# Patient Record
Sex: FEMALE | Race: WHITE | Employment: FULL TIME | URBAN - METROPOLITAN AREA
[De-identification: names, ages, dates, MRNs, and addresses within clinical notes are randomized per-mention and may not be internally consistent; named-entity substitution may affect disease eponyms.]

---

## 2017-08-31 ENCOUNTER — PATIENT OUTREACH (OUTPATIENT)
Dept: FAMILY MEDICINE CLINIC | Facility: CLINIC | Age: 52
End: 2017-08-31

## 2018-01-26 ENCOUNTER — PATIENT OUTREACH (OUTPATIENT)
Dept: FAMILY MEDICINE CLINIC | Facility: CLINIC | Age: 53
End: 2018-01-26

## 2018-04-04 ENCOUNTER — OFFICE VISIT (OUTPATIENT)
Dept: FAMILY MEDICINE CLINIC | Facility: CLINIC | Age: 53
End: 2018-04-04

## 2018-04-04 ENCOUNTER — APPOINTMENT (OUTPATIENT)
Dept: LAB | Age: 53
End: 2018-04-04
Attending: NURSE PRACTITIONER

## 2018-04-04 VITALS
OXYGEN SATURATION: 97 % | DIASTOLIC BLOOD PRESSURE: 100 MMHG | WEIGHT: 145.19 LBS | RESPIRATION RATE: 16 BRPM | HEIGHT: 62.25 IN | TEMPERATURE: 99 F | BODY MASS INDEX: 26.38 KG/M2 | SYSTOLIC BLOOD PRESSURE: 186 MMHG | HEART RATE: 90 BPM

## 2018-04-04 DIAGNOSIS — I10 ESSENTIAL HYPERTENSION: Primary | ICD-10-CM

## 2018-04-04 PROCEDURE — 99203 OFFICE O/P NEW LOW 30 MIN: CPT | Performed by: NURSE PRACTITIONER

## 2018-04-04 PROCEDURE — 80053 COMPREHEN METABOLIC PANEL: CPT | Performed by: NURSE PRACTITIONER

## 2018-04-04 PROCEDURE — 36415 COLL VENOUS BLD VENIPUNCTURE: CPT | Performed by: NURSE PRACTITIONER

## 2018-04-04 RX ORDER — LISINOPRIL 10 MG/1
10 TABLET ORAL
Qty: 30 TABLET | Refills: 0 | Status: SHIPPED | OUTPATIENT
Start: 2018-04-04 | End: 2018-04-06

## 2018-04-04 RX ORDER — HYDROCHLOROTHIAZIDE 25 MG/1
25 TABLET ORAL
Qty: 30 TABLET | Refills: 0 | Status: SHIPPED | OUTPATIENT
Start: 2018-04-04 | End: 2018-04-06

## 2018-04-04 NOTE — PATIENT INSTRUCTIONS
CMP pending  Start lisinopril and HCTZ daily  Recheck on Friday or Saturday - sooner if pressure not improving.

## 2018-04-04 NOTE — PROGRESS NOTES
HPI:    Patient ID: Antwan Bassett is a 47year old female. HPI     Present as a new patient to our clinic. States that she lost her health care insurance at her employer. Works in [employer]. Has not been taking medication for over a year.    Kennedy toro Neck: Normal range of motion. Neck supple. No thyromegaly present. Cardiovascular: Normal rate, regular rhythm, normal heart sounds and intact distal pulses. Pulmonary/Chest: Effort normal and breath sounds normal.   Abdominal: Soft.  Normal appearan

## 2018-04-05 ENCOUNTER — TELEPHONE (OUTPATIENT)
Dept: FAMILY MEDICINE CLINIC | Facility: CLINIC | Age: 53
End: 2018-04-05

## 2018-04-05 NOTE — TELEPHONE ENCOUNTER
----- Message from DONALD Lynn sent at 4/4/2018  8:19 PM CDT -----  Labs normal. Please let patient know. Thank you.  Caro Haskins, 04/04/18, 8:19 PM

## 2018-04-06 ENCOUNTER — OFFICE VISIT (OUTPATIENT)
Dept: FAMILY MEDICINE CLINIC | Facility: CLINIC | Age: 53
End: 2018-04-06

## 2018-04-06 VITALS
WEIGHT: 144 LBS | HEIGHT: 62.25 IN | TEMPERATURE: 99 F | DIASTOLIC BLOOD PRESSURE: 78 MMHG | HEART RATE: 92 BPM | BODY MASS INDEX: 26.17 KG/M2 | RESPIRATION RATE: 14 BRPM | SYSTOLIC BLOOD PRESSURE: 120 MMHG

## 2018-04-06 DIAGNOSIS — I10 ESSENTIAL HYPERTENSION: Primary | ICD-10-CM

## 2018-04-06 PROCEDURE — 99212 OFFICE O/P EST SF 10 MIN: CPT | Performed by: NURSE PRACTITIONER

## 2018-04-06 RX ORDER — LISINOPRIL 10 MG/1
10 TABLET ORAL
Qty: 90 TABLET | Refills: 0 | Status: SHIPPED | OUTPATIENT
Start: 2018-04-06 | End: 2018-08-13

## 2018-04-06 RX ORDER — HYDROCHLOROTHIAZIDE 25 MG/1
25 TABLET ORAL
Qty: 90 TABLET | Refills: 0 | Status: SHIPPED | OUTPATIENT
Start: 2018-04-06 | End: 2018-08-13

## 2018-04-06 NOTE — PATIENT INSTRUCTIONS
Continue current medications. Refills done. Recheck in 3 months–sooner if gets insurance before then. Otherwise follow-up as needed.

## 2018-04-06 NOTE — PROGRESS NOTES
HPI:    Patient ID: Yazmin Chapman is a 47year old female. HPI   Patient is present for recheck on her hypertension. She did start taking her lisinopril and hydrochlorothiazide.   States that she does notice a significant change as far is even how he Body mass index is 26.13 kg/m². ASSESSMENT/PLAN:   Essential hypertension  (primary encounter diagnosis)    No orders of the defined types were placed in this encounter.       Meds This Visit:  Signed Prescriptions Disp Refills    hydrochlor

## 2018-05-19 RX ORDER — TRAZODONE HYDROCHLORIDE 50 MG/1
50 TABLET ORAL
Qty: 90 TABLET | Refills: 0 | OUTPATIENT
Start: 2018-05-19

## 2018-05-19 NOTE — TELEPHONE ENCOUNTER
Medication never prescribed by our office. Last refill was a year ago - per [place] . Needs appointment to discuss.    MARIELY Reina, ROB-BC  5/19/2018  11:02 AM

## 2018-05-19 NOTE — TELEPHONE ENCOUNTER
It doesn't look like this medication is prescribed by us. Last RF given by Dr. Deric Boyle. Please advise.      Future appt:    Last Appointment:  4/6/2018; Recheck in 3 months    Cholesterol, Total (mg/dL)   Date Value   03/26/2015 250 (H)   ----------  HDL Ch

## 2018-05-19 NOTE — TELEPHONE ENCOUNTER
Patient informed of the below recommendations. States once she get her insurance \"figured out\" she will c/b to make an appt.

## 2018-06-26 ENCOUNTER — OFFICE VISIT (OUTPATIENT)
Dept: FAMILY MEDICINE CLINIC | Facility: CLINIC | Age: 53
End: 2018-06-26

## 2018-06-26 VITALS
RESPIRATION RATE: 16 BRPM | SYSTOLIC BLOOD PRESSURE: 99 MMHG | TEMPERATURE: 98 F | OXYGEN SATURATION: 99 % | HEIGHT: 62.25 IN | BODY MASS INDEX: 24.89 KG/M2 | DIASTOLIC BLOOD PRESSURE: 48 MMHG | HEART RATE: 76 BPM | WEIGHT: 137 LBS

## 2018-06-26 DIAGNOSIS — J40 SINOBRONCHITIS: Primary | ICD-10-CM

## 2018-06-26 DIAGNOSIS — J32.9 SINOBRONCHITIS: Primary | ICD-10-CM

## 2018-06-26 PROCEDURE — 99214 OFFICE O/P EST MOD 30 MIN: CPT | Performed by: NURSE PRACTITIONER

## 2018-06-26 RX ORDER — PROMETHAZINE HYDROCHLORIDE AND CODEINE PHOSPHATE 6.25; 1 MG/5ML; MG/5ML
5 SYRUP ORAL EVERY 6 HOURS PRN
Qty: 118 ML | Refills: 0 | Status: SHIPPED
Start: 2018-06-26 | End: 2018-07-09 | Stop reason: ALTCHOICE

## 2018-06-26 RX ORDER — AZITHROMYCIN 500 MG/1
500 TABLET, FILM COATED ORAL DAILY
Qty: 7 TABLET | Refills: 0 | Status: SHIPPED | OUTPATIENT
Start: 2018-06-26 | End: 2018-06-29 | Stop reason: ALTCHOICE

## 2018-06-26 NOTE — PROGRESS NOTES
HPI:    Patient ID: Justice Gotti is a 47year old female. HPI    Started with a cold that got worse quickly. Is coughing up yellow sputum. States it hurts to cough. Fever 101 yesterday when got home from work.    No known exposure  Sudafed and Advi ear and ear canal normal.   Nose: Mucosal edema present. Mouth/Throat: Posterior oropharyngeal erythema present. Eyes: Conjunctivae are normal.   Neck: Normal range of motion. Neck supple.    Cardiovascular: Normal rate, regular rhythm and normal heart

## 2018-06-27 ENCOUNTER — TELEPHONE (OUTPATIENT)
Dept: FAMILY MEDICINE CLINIC | Facility: CLINIC | Age: 53
End: 2018-06-27

## 2018-06-27 NOTE — TELEPHONE ENCOUNTER
Antibiotics needs 48 to 72 hours to start working. If significantly worse, can go to  ER or a walk-in clinic as we do not have any appointments here today. If needs a note for more time from work, I will gladly write this for patient.    Please let daisha

## 2018-06-27 NOTE — TELEPHONE ENCOUNTER
patient was seen yesterday and was given an abx- patient states that she is still running a fever and does not feel any better - wants to speak to nurse

## 2018-06-27 NOTE — TELEPHONE ENCOUNTER
Let pt know the following below. Pt verbalized her understanding and had no other questions at this time. Patient asks that we write a note for tomorrow.

## 2018-06-27 NOTE — TELEPHONE ENCOUNTER
Patient is calling stating that she isnt feeling any better. Patient has taken 2 doses of the zpack. Patient states that she really expected to feel better after first two doses. Patient states that she has a fever still.  Asked patient if she had taken

## 2018-06-29 ENCOUNTER — OFFICE VISIT (OUTPATIENT)
Dept: FAMILY MEDICINE CLINIC | Facility: CLINIC | Age: 53
End: 2018-06-29

## 2018-06-29 ENCOUNTER — HOSPITAL ENCOUNTER (OUTPATIENT)
Dept: GENERAL RADIOLOGY | Age: 53
Discharge: HOME | End: 2018-06-29
Attending: NURSE PRACTITIONER
Payer: COMMERCIAL

## 2018-06-29 ENCOUNTER — TELEPHONE (OUTPATIENT)
Dept: FAMILY MEDICINE CLINIC | Facility: CLINIC | Age: 53
End: 2018-06-29

## 2018-06-29 VITALS
TEMPERATURE: 100 F | OXYGEN SATURATION: 98 % | BODY MASS INDEX: 24.71 KG/M2 | SYSTOLIC BLOOD PRESSURE: 90 MMHG | HEIGHT: 62.25 IN | RESPIRATION RATE: 20 BRPM | WEIGHT: 136 LBS | DIASTOLIC BLOOD PRESSURE: 62 MMHG | HEART RATE: 102 BPM

## 2018-06-29 DIAGNOSIS — Z72.0 TOBACCO USE: ICD-10-CM

## 2018-06-29 DIAGNOSIS — R05.9 COUGH: Primary | ICD-10-CM

## 2018-06-29 DIAGNOSIS — R05.9 COUGH: ICD-10-CM

## 2018-06-29 PROCEDURE — 99214 OFFICE O/P EST MOD 30 MIN: CPT | Performed by: NURSE PRACTITIONER

## 2018-06-29 PROCEDURE — 71046 X-RAY EXAM CHEST 2 VIEWS: CPT | Performed by: NURSE PRACTITIONER

## 2018-06-29 RX ORDER — FLUTICASONE PROPIONATE AND SALMETEROL 250; 50 UG/1; UG/1
1 POWDER RESPIRATORY (INHALATION) EVERY 12 HOURS SCHEDULED
Qty: 2 PACKAGE | Refills: 0 | COMMUNITY
Start: 2018-06-29 | End: 2018-08-13 | Stop reason: ALTCHOICE

## 2018-06-29 RX ORDER — LEVOFLOXACIN 500 MG/1
500 TABLET, FILM COATED ORAL DAILY
Qty: 10 TABLET | Refills: 0 | Status: SHIPPED | OUTPATIENT
Start: 2018-06-29 | End: 2018-07-09 | Stop reason: ALTCHOICE

## 2018-06-29 NOTE — TELEPHONE ENCOUNTER
Patient was seen on 6/26/18 with Caro  Patient states she is taking the abx and promethazine  Patients states she is not feeling any better  States she is still coughing and wheezing  Patient having bouts of coughing on the phone and voice is hoarse  Stat

## 2018-06-29 NOTE — TELEPHONE ENCOUNTER
was seen Tuesday by Caro  and now c/o feeling far worse   w/ wheezing   and is crying . ..     please advise  / call back ASAP

## 2018-06-29 NOTE — PATIENT INSTRUCTIONS
Stop zithromax. Start Levaquin. Use advair as directed - rinse mouth after using. Fluids and rest.   If worsens, to ER. Note for work done. Recheck in 2 weeks - sooner if needed.

## 2018-06-29 NOTE — TELEPHONE ENCOUNTER
Recommend that patient schedule appointment. I can try to work her in at 2:15 today. She may need CXR and/or breathing treatment. Please let patient know. Thank you.  Caro Haskins, 06/29/18, 9:29 AM

## 2018-06-29 NOTE — PROGRESS NOTES
HPI:    Patient ID: Lina Prado is a 47year old female. HPI    Patient present with complaints of continued cough. States that she is not feeling any better. Is now having diarrhea from the antibiotic. Taking Robitussin at times.  Taking Prometha Posterior oropharyngeal erythema present. Eyes: Conjunctivae are normal.   Neck: Normal range of motion. Neck supple. Cardiovascular: Normal rate, regular rhythm and normal heart sounds. Exam reveals no friction rub. No murmur heard.   Pulmonary/Michelle

## 2018-07-02 ENCOUNTER — TELEPHONE (OUTPATIENT)
Dept: FAMILY MEDICINE CLINIC | Facility: CLINIC | Age: 53
End: 2018-07-02

## 2018-07-09 ENCOUNTER — OFFICE VISIT (OUTPATIENT)
Dept: FAMILY MEDICINE CLINIC | Facility: CLINIC | Age: 53
End: 2018-07-09

## 2018-07-09 VITALS
BODY MASS INDEX: 24.71 KG/M2 | HEART RATE: 86 BPM | OXYGEN SATURATION: 97 % | HEIGHT: 62.25 IN | DIASTOLIC BLOOD PRESSURE: 60 MMHG | WEIGHT: 136 LBS | TEMPERATURE: 98 F | RESPIRATION RATE: 16 BRPM | SYSTOLIC BLOOD PRESSURE: 100 MMHG

## 2018-07-09 DIAGNOSIS — J18.9 PNEUMONIA DUE TO INFECTIOUS ORGANISM, UNSPECIFIED LATERALITY, UNSPECIFIED PART OF LUNG: Primary | ICD-10-CM

## 2018-07-09 PROCEDURE — 99213 OFFICE O/P EST LOW 20 MIN: CPT | Performed by: NURSE PRACTITIONER

## 2018-07-09 RX ORDER — TRAZODONE HYDROCHLORIDE 50 MG/1
TABLET ORAL
Qty: 90 TABLET | Refills: 0 | Status: SHIPPED | OUTPATIENT
Start: 2018-07-09 | End: 2018-08-13

## 2018-07-09 NOTE — PROGRESS NOTES
HPI:    Patient ID: Trent Austin is a 47year old female. HPI     Present for recheck on lungs. Would like a refill of trazodone to help her sleep. Has been on this for years. Will be changing to Dr. Char Brothers for primary care.    Patient is tr dry.   Psychiatric: She has a normal mood and affect. Her behavior is normal. Judgment and thought content normal.   Nursing note and vitals reviewed.      07/09/18  1441   BP: 100/60   Pulse: 86   Resp: 16   Temp: 97.9 °F (36.6 °C)   TempSrc: Temporal   Sp

## 2018-08-13 ENCOUNTER — OFFICE VISIT (OUTPATIENT)
Dept: FAMILY MEDICINE CLINIC | Facility: CLINIC | Age: 53
End: 2018-08-13
Payer: COMMERCIAL

## 2018-08-13 VITALS
TEMPERATURE: 99 F | RESPIRATION RATE: 16 BRPM | BODY MASS INDEX: 23.92 KG/M2 | WEIGHT: 130 LBS | SYSTOLIC BLOOD PRESSURE: 114 MMHG | DIASTOLIC BLOOD PRESSURE: 62 MMHG | HEART RATE: 76 BPM | HEIGHT: 61.75 IN

## 2018-08-13 DIAGNOSIS — G47.9 SLEEP DISORDER: ICD-10-CM

## 2018-08-13 DIAGNOSIS — Z00.00 HEALTH CARE MAINTENANCE: Primary | ICD-10-CM

## 2018-08-13 DIAGNOSIS — I10 ESSENTIAL HYPERTENSION: ICD-10-CM

## 2018-08-13 DIAGNOSIS — Z72.0 TOBACCO USE: ICD-10-CM

## 2018-08-13 DIAGNOSIS — R53.83 FATIGUE, UNSPECIFIED TYPE: ICD-10-CM

## 2018-08-13 PROCEDURE — 99396 PREV VISIT EST AGE 40-64: CPT | Performed by: FAMILY MEDICINE

## 2018-08-13 RX ORDER — TRAZODONE HYDROCHLORIDE 50 MG/1
TABLET ORAL
Qty: 90 TABLET | Refills: 3 | Status: SHIPPED | OUTPATIENT
Start: 2018-08-13 | End: 2019-08-21

## 2018-08-13 RX ORDER — LISINOPRIL 10 MG/1
10 TABLET ORAL
Qty: 90 TABLET | Refills: 3 | Status: SHIPPED | OUTPATIENT
Start: 2018-08-13 | End: 2019-06-13

## 2018-08-13 RX ORDER — HYDROCHLOROTHIAZIDE 25 MG/1
25 TABLET ORAL
Qty: 90 TABLET | Refills: 3 | Status: SHIPPED | OUTPATIENT
Start: 2018-08-13 | End: 2019-06-13

## 2018-08-13 NOTE — PATIENT INSTRUCTIONS
Good exam today     Consider mammogram in near future ( can get at office in near future )     Obtain labs in near future.      Recheck in 1 year

## 2018-08-13 NOTE — PROGRESS NOTES
Chief Complaint:   Patient presents with:  Physical: does not want a pap      HPI:   This is a 47year old female coming in for healthcare check. Patient overall is been feeling well she does have some issues of chronic fatigue.   She feels that it is rela Denies eye pain, visual changes,     Ears, Nose, Throat:  Denies hearing loss,or disturbance. Denies sore throat  INTEGUMENTARY:  Denies rashes, itching.     CARDIOVASCULAR: Denies  chest discomfort, palpitations, edema,  RESPIRATORY:  Denies shortness of b tenderness,FROM. EXTREMITIES:  No edema, no cyanosis,FROM, 2+ dorsalis pedis pulses bilaterally. NEURO:  No deficit, normal gait, strength and tone, sensory intact,   PSYCH: no depression or anxiety noted. ASSESSMENT AND PLAN:   1.  [facility] Benny Angelucci, MD  8/13/2018  4:35 PM

## 2018-08-27 LAB
ABSOLUTE BASOPHILS: 47 CELLS/UL (ref 0–200)
ABSOLUTE EOSINOPHILS: 269 CELLS/UL (ref 15–500)
ABSOLUTE LYMPHOCYTES: 2236 CELLS/UL (ref 850–3900)
ABSOLUTE MONOCYTES: 419 CELLS/UL (ref 200–950)
ABSOLUTE NEUTROPHILS: 4930 CELLS/UL (ref 1500–7800)
ALBUMIN/GLOBULIN RATIO: 1.9 (CALC) (ref 1–2.5)
ALBUMIN: 4.2 G/DL (ref 3.6–5.1)
ALKALINE PHOSPHATASE: 47 U/L (ref 33–130)
ALT: 13 U/L (ref 6–29)
AST: 16 U/L (ref 10–35)
BASOPHILS: 0.6 %
BILIRUBIN, TOTAL: 0.3 MG/DL (ref 0.2–1.2)
BUN: 22 MG/DL (ref 7–25)
CALCIUM: 9.7 MG/DL (ref 8.6–10.4)
CARBON DIOXIDE: 26 MMOL/L (ref 20–32)
CHLORIDE: 105 MMOL/L (ref 98–110)
CHOL/HDLC RATIO: 5.2 (CALC)
CHOLESTEROL, TOTAL: 203 MG/DL
CREATININE: 0.89 MG/DL (ref 0.5–1.05)
EGFR IF AFRICN AM: 87 ML/MIN/1.73M2
EGFR IF NONAFRICN AM: 75 ML/MIN/1.73M2
EOSINOPHILS: 3.4 %
GLOBULIN: 2.2 G/DL (CALC) (ref 1.9–3.7)
GLUCOSE: 113 MG/DL (ref 65–99)
HDL CHOLESTEROL: 39 MG/DL
HEMATOCRIT: 36.1 % (ref 35–45)
HEMOGLOBIN: 11.9 G/DL (ref 11.7–15.5)
LDL-CHOLESTEROL: 131 MG/DL (CALC)
LYMPHOCYTES: 28.3 %
MCH: 31.8 PG (ref 27–33)
MCHC: 33 G/DL (ref 32–36)
MCV: 96.5 FL (ref 80–100)
MONOCYTES: 5.3 %
MPV: 9.9 FL (ref 7.5–12.5)
NEUTROPHILS: 62.4 %
NON-HDL CHOLESTEROL: 164 MG/DL (CALC)
PLATELET COUNT: 325 THOUSAND/UL (ref 140–400)
POTASSIUM: 3.9 MMOL/L (ref 3.5–5.3)
PROTEIN, TOTAL: 6.4 G/DL (ref 6.1–8.1)
RDW: 12.6 % (ref 11–15)
RED BLOOD CELL COUNT: 3.74 MILLION/UL (ref 3.8–5.1)
SODIUM: 139 MMOL/L (ref 135–146)
TRIGLYCERIDES: 190 MG/DL
TSH W/REFLEX TO FT4: 0.65 MIU/L
VITAMIN D, 25-OH, TOTAL: 32 NG/ML (ref 30–100)
WHITE BLOOD CELL COUNT: 7.9 THOUSAND/UL (ref 3.8–10.8)

## 2018-08-28 ENCOUNTER — TELEPHONE (OUTPATIENT)
Dept: FAMILY MEDICINE CLINIC | Facility: CLINIC | Age: 53
End: 2018-08-28

## 2018-08-28 NOTE — TELEPHONE ENCOUNTER
----- Message from Catalina Andersen MD sent at 8/28/2018  9:14 AM CDT -----  Labs reviewed. Cholesterol profile overall improved. Total cholesterol came down from 250-203 and LDL cholesterol came down from 179-131. This is a good change.   Her HDL unfo

## 2019-01-22 ENCOUNTER — OFFICE VISIT (OUTPATIENT)
Dept: FAMILY MEDICINE CLINIC | Facility: CLINIC | Age: 54
End: 2019-01-22
Payer: COMMERCIAL

## 2019-01-22 VITALS
OXYGEN SATURATION: 93 % | BODY MASS INDEX: 23 KG/M2 | HEART RATE: 81 BPM | WEIGHT: 124 LBS | RESPIRATION RATE: 16 BRPM | TEMPERATURE: 98 F | DIASTOLIC BLOOD PRESSURE: 78 MMHG | SYSTOLIC BLOOD PRESSURE: 122 MMHG

## 2019-01-22 DIAGNOSIS — J32.9 SINOBRONCHITIS: Primary | ICD-10-CM

## 2019-01-22 DIAGNOSIS — J40 SINOBRONCHITIS: Primary | ICD-10-CM

## 2019-01-22 PROCEDURE — 99214 OFFICE O/P EST MOD 30 MIN: CPT | Performed by: NURSE PRACTITIONER

## 2019-01-22 RX ORDER — ALBUTEROL SULFATE 90 UG/1
2 AEROSOL, METERED RESPIRATORY (INHALATION) EVERY 4 HOURS PRN
Qty: 1 INHALER | Refills: 6 | Status: SHIPPED | OUTPATIENT
Start: 2019-01-22

## 2019-01-22 RX ORDER — LEVOFLOXACIN 500 MG/1
500 TABLET, FILM COATED ORAL DAILY
Qty: 10 TABLET | Refills: 0 | Status: SHIPPED | OUTPATIENT
Start: 2019-01-22 | End: 2019-02-01

## 2019-01-22 NOTE — PATIENT INSTRUCTIONS
Directed to take antibiotics until gone. Recommend to eat yogurt or take probiotic daily while on antibiotic, but not the same time as the antibiotic. Treat symptoms as needed. Use albuterol as needed for the cough.    Encouraged to decrease her smoking

## 2019-01-22 NOTE — PROGRESS NOTES
HPI:    Patient ID: Francisco Jean is a 47year old female. HPI     Present with complaints of a bad taste in her mouth. Now getting nasal congestion and moved to her head. Is starting to cough. Phlegm is bright yellow.    Been taking Mucinex at night membrane, external ear and ear canal normal.   Left Ear: Hearing, tympanic membrane, external ear and ear canal normal.   Nose: Mucosal edema present. Right sinus exhibits maxillary sinus tenderness and frontal sinus tenderness.  Left sinus exhibits maxilla as needed.            OY#[id number]

## 2019-03-20 ENCOUNTER — TELEPHONE (OUTPATIENT)
Dept: FAMILY MEDICINE CLINIC | Facility: CLINIC | Age: 54
End: 2019-03-20

## 2019-03-20 NOTE — TELEPHONE ENCOUNTER
We received a request for medical records from [facility]. They are requesting a copy of pt's entire medical history from the last 5 years, this was sent to [employer].

## 2019-04-09 ENCOUNTER — TELEPHONE (OUTPATIENT)
Dept: FAMILY MEDICINE CLINIC | Facility: CLINIC | Age: 54
End: 2019-04-09

## 2019-04-10 NOTE — TELEPHONE ENCOUNTER
Medical Records request was recieved from [id number] [place] [address] requesting 1/1/2014-present. Request sent to [employer].

## 2019-06-13 RX ORDER — LISINOPRIL 10 MG/1
TABLET ORAL
Qty: 30 TABLET | Refills: 1 | Status: SHIPPED | OUTPATIENT
Start: 2019-06-13 | End: 2019-07-06

## 2019-06-13 RX ORDER — HYDROCHLOROTHIAZIDE 25 MG/1
TABLET ORAL
Qty: 30 TABLET | Refills: 1 | Status: SHIPPED | OUTPATIENT
Start: 2019-06-13 | End: 2019-07-06

## 2019-06-13 NOTE — TELEPHONE ENCOUNTER
Future appt:  No future appointments.   Last Appointment: 8/13/18   CHOLESTEROL, TOTAL (mg/dL)   Date Value   08/25/2018 203 (H)     HDL CHOLESTEROL (mg/dL)   Date Value   08/25/2018 39 (L)     LDL-CHOLESTEROL (mg/dL (calc))   Date Value   08/25/2018 131 (H

## 2019-07-06 NOTE — TELEPHONE ENCOUNTER
Future appt:    Last Appointment: 8/13/18 physical; recheck 1 year  CHOLESTEROL, TOTAL (mg/dL)   Date Value   08/25/2018 203 (H)     HDL CHOLESTEROL (mg/dL)   Date Value   08/25/2018 39 (L)     LDL-CHOLESTEROL (mg/dL (calc))   Date Value   08/25/2018 131 (

## 2019-07-08 RX ORDER — HYDROCHLOROTHIAZIDE 25 MG/1
TABLET ORAL
Qty: 30 TABLET | Refills: 1 | Status: SHIPPED | OUTPATIENT
Start: 2019-07-08 | End: 2019-08-01

## 2019-07-08 RX ORDER — LISINOPRIL 10 MG/1
TABLET ORAL
Qty: 30 TABLET | Refills: 1 | Status: SHIPPED | OUTPATIENT
Start: 2019-07-08 | End: 2019-08-01

## 2019-07-08 NOTE — TELEPHONE ENCOUNTER
Patient returned call to the . Called patient and left detailed message informing her of the below and asking for her to call back to schedule.

## 2019-07-08 NOTE — TELEPHONE ENCOUNTER
Your appointments     Date & Time Appointment Department [facility])    Aug 21, 2019  3:30 PM CDT Physical - Established Patient with Tiana Maria MD [address], [facility] ([address] [facility])            [facility]

## 2019-07-08 NOTE — TELEPHONE ENCOUNTER
Chart reviewed     Alan Esparza for refill to get patient to appointment     I do not see that patient has had mammogram     Patient is due for physical in August.

## 2019-08-01 ENCOUNTER — TELEPHONE (OUTPATIENT)
Dept: FAMILY MEDICINE CLINIC | Facility: CLINIC | Age: 54
End: 2019-08-01

## 2019-08-01 DIAGNOSIS — I10 ESSENTIAL HYPERTENSION: Primary | ICD-10-CM

## 2019-08-01 RX ORDER — HYDROCHLOROTHIAZIDE 25 MG/1
TABLET ORAL
Qty: 30 TABLET | Refills: 1 | OUTPATIENT
Start: 2019-08-01

## 2019-08-01 NOTE — TELEPHONE ENCOUNTER
question on her lisinopril and hctz - insurance is looking for 90day supply, did  first 30 with 30 remaining

## 2019-08-01 NOTE — TELEPHONE ENCOUNTER
Future appt:     Your appointments     Date & Time Appointment Department [facility])    Aug 21, 2019  3:30 PM CDT Physical - Established Patient with Eric Negrete MD [address] [facility], [place]one Soulier ([facility])

## 2019-08-02 RX ORDER — HYDROCHLOROTHIAZIDE 25 MG/1
25 TABLET ORAL
Qty: 90 TABLET | Refills: 0 | Status: SHIPPED | OUTPATIENT
Start: 2019-08-02 | End: 2019-08-21

## 2019-08-02 RX ORDER — LISINOPRIL 10 MG/1
10 TABLET ORAL
Qty: 90 TABLET | Refills: 0 | Status: SHIPPED | OUTPATIENT
Start: 2019-08-02 | End: 2019-08-21

## 2019-08-21 ENCOUNTER — OFFICE VISIT (OUTPATIENT)
Dept: FAMILY MEDICINE CLINIC | Facility: CLINIC | Age: 54
End: 2019-08-21
Payer: COMMERCIAL

## 2019-08-21 VITALS
SYSTOLIC BLOOD PRESSURE: 120 MMHG | RESPIRATION RATE: 14 BRPM | OXYGEN SATURATION: 98 % | DIASTOLIC BLOOD PRESSURE: 66 MMHG | HEART RATE: 74 BPM | TEMPERATURE: 98 F | HEIGHT: 61.75 IN | BODY MASS INDEX: 21.83 KG/M2 | WEIGHT: 118.63 LBS

## 2019-08-21 DIAGNOSIS — Z00.00 HEALTH CARE MAINTENANCE: Primary | ICD-10-CM

## 2019-08-21 DIAGNOSIS — E78.00 PURE HYPERCHOLESTEROLEMIA: ICD-10-CM

## 2019-08-21 DIAGNOSIS — I10 ESSENTIAL HYPERTENSION: ICD-10-CM

## 2019-08-21 DIAGNOSIS — E55.9 VITAMIN D DEFICIENCY: ICD-10-CM

## 2019-08-21 PROCEDURE — 99396 PREV VISIT EST AGE 40-64: CPT | Performed by: FAMILY MEDICINE

## 2019-08-21 RX ORDER — HYDROCHLOROTHIAZIDE 25 MG/1
25 TABLET ORAL
Qty: 90 TABLET | Refills: 3 | Status: SHIPPED | OUTPATIENT
Start: 2019-08-21 | End: 2020-10-12

## 2019-08-21 RX ORDER — TRAZODONE HYDROCHLORIDE 50 MG/1
TABLET ORAL
Qty: 90 TABLET | Refills: 3 | Status: SHIPPED | OUTPATIENT
Start: 2019-08-21 | End: 2020-07-13

## 2019-08-21 RX ORDER — LISINOPRIL 10 MG/1
10 TABLET ORAL
Qty: 90 TABLET | Refills: 3 | Status: SHIPPED | OUTPATIENT
Start: 2019-08-21 | End: 2020-10-12

## 2019-08-21 NOTE — PATIENT INSTRUCTIONS
Good exam       Obtain labs in near fuutre. Call insurance - recommend mammogram   We have mammogram here now. Continue with current medication - refills sent in     Recheck in 1 year.

## 2019-08-21 NOTE — PROGRESS NOTES
Chief Complaint:   Patient presents with:  Physical      HPI:   This is a 47year old female coming in for healthcare check. Discussed heart disease prevention, cancer early detection and immunizations. Patient continues to smoke.   She was able to cu PHOSPHATASE 47 33 - 130 U/L    AST 16 10 - 35 U/L    ALT 13 6 - 29 U/L   CBC WITH DIFFERENTIAL WITH PLATELET   Result Value Ref Range    WHITE BLOOD CELL COUNT 7.9 3.8 - 10.8 Thousand/uL    RED BLOOD CELL COUNT 3.74 (L) 3.80 - 5.10 Million/uL    HEMOGLOBIN status: Current Every Day Smoker        Packs/day: 0.50        Years: 33.00        Pack years: 16.5      Smokeless tobacco: Former User    Substance and Sexual Activity      Alcohol use: No      Drug use: No      Sexual activity: Yes        Partners: Male anxiety. ENDOCRINOLOGIC:  Denies cold or heat intolerance,   ALLERGIES:  Denies allergic response,    EXAM:   /66 (BP Location: Left arm, Patient Position: Sitting, Cuff Size: adult)   Pulse 74   Temp 97.8 °F (36.6 °C) (Temporal)   Resp 14   Ht 61. 7 PANEL    3. Vitamin D deficiency    - VITAMIN D, 25-HYDROXY    4. Pure hypercholesterolemia          Meds & Refills for this Visit:  Requested Prescriptions     Signed Prescriptions Disp Refills   • lisinopril 10 MG Oral Tab 90 tablet 3     Sig: Take 1 tab

## 2020-07-13 RX ORDER — TRAZODONE HYDROCHLORIDE 50 MG/1
TABLET ORAL
Qty: 90 TABLET | Refills: 0 | Status: SHIPPED | OUTPATIENT
Start: 2020-07-13 | End: 2020-10-09

## 2020-07-13 NOTE — TELEPHONE ENCOUNTER
Please advise refill of Trazodone 50mg. Last Rx:8/21/19    Future appt:    Last Appointment with provider: 8/21/19 for Physical - per notes recheck in 1 year. Last appointment at [facility]: 8/21/19    CHOLESTEROL, TOTAL (mg/dL)   Date Value   08/25/2018 203 (H)     HDL CHOLESTEROL (mg/dL)   Date Value   08/25/2018 39 (L)     LDL-CHOLESTEROL (mg/dL (calc))   Date Value   08/25/2018 131 (H)     TRIGLYCERIDES (mg/dL)   Date Value   08/25/2018 190 (H)     No results found for: EAG, A1C  Lab Results   Component Value Date    TSH 0.510 03/26/2015    TSHT4 0.65 08/25/2018       No follow-ups on file.

## 2020-10-09 RX ORDER — TRAZODONE HYDROCHLORIDE 50 MG/1
TABLET ORAL
Qty: 90 TABLET | Refills: 0 | Status: SHIPPED | OUTPATIENT
Start: 2020-10-09 | End: 2021-01-09

## 2020-10-09 NOTE — TELEPHONE ENCOUNTER
Future appt: Your appointments     Date & Time Appointment Department [facility])    Oct 13, 2020  3:30 PM CDT Physical - Established with Zoraida Bashir MD [address] [facility] ([facility])            [address] [facility]  [place] [address] [id number]  [phone]        Last Appointment with provider: 8/21/2019    Last appointment at [facility]:  Visit date not found  CHOLESTEROL, TOTAL (mg/dL)   Date Value   08/25/2018 203 (H)     HDL CHOLESTEROL (mg/dL)   Date Value   08/25/2018 39 (L)     LDL-CHOLESTEROL (mg/dL (calc))   Date Value   08/25/2018 131 (H)     TRIGLYCERIDES (mg/dL)   Date Value   08/25/2018 190 (H)     No results found for: EAG, A1C  Lab Results   Component Value Date    TSH 0.510 03/26/2015    TSHT4 0.65 08/25/2018       No follow-ups on file.

## 2020-10-12 DIAGNOSIS — I10 ESSENTIAL HYPERTENSION: ICD-10-CM

## 2020-10-12 RX ORDER — HYDROCHLOROTHIAZIDE 25 MG/1
TABLET ORAL
Qty: 90 TABLET | Refills: 0 | Status: SHIPPED | OUTPATIENT
Start: 2020-10-12 | End: 2021-01-09

## 2020-10-12 RX ORDER — LISINOPRIL 10 MG/1
TABLET ORAL
Qty: 90 TABLET | Refills: 0 | Status: SHIPPED | OUTPATIENT
Start: 2020-10-12 | End: 2021-01-09

## 2020-10-12 NOTE — TELEPHONE ENCOUNTER
Future appt:     Your appointments     Date & Time Appointment Department [facility])    Oct 19, 2020  3:30 PM CDT Physical - Established with Caro Haskins, [address] [place] [address], [place] ([place])            [facility]

## 2020-10-19 ENCOUNTER — OFFICE VISIT (OUTPATIENT)
Dept: FAMILY MEDICINE CLINIC | Facility: CLINIC | Age: 55
End: 2020-10-19
Payer: COMMERCIAL

## 2020-10-19 VITALS
HEART RATE: 81 BPM | RESPIRATION RATE: 16 BRPM | DIASTOLIC BLOOD PRESSURE: 70 MMHG | SYSTOLIC BLOOD PRESSURE: 120 MMHG | HEIGHT: 61.75 IN | WEIGHT: 119 LBS | BODY MASS INDEX: 21.9 KG/M2 | TEMPERATURE: 98 F | OXYGEN SATURATION: 100 %

## 2020-10-19 DIAGNOSIS — Z13.0 SCREENING FOR DEFICIENCY ANEMIA: ICD-10-CM

## 2020-10-19 DIAGNOSIS — Z01.419 ENCOUNTER FOR WELL WOMAN EXAM: ICD-10-CM

## 2020-10-19 DIAGNOSIS — Z13.6 SCREENING FOR CARDIOVASCULAR CONDITION: ICD-10-CM

## 2020-10-19 DIAGNOSIS — Z13.220 LIPID SCREENING: ICD-10-CM

## 2020-10-19 DIAGNOSIS — Z13.29 THYROID DISORDER SCREEN: ICD-10-CM

## 2020-10-19 DIAGNOSIS — Z00.00 WELLNESS EXAMINATION: Primary | ICD-10-CM

## 2020-10-19 PROCEDURE — 88175 CYTOPATH C/V AUTO FLUID REDO: CPT | Performed by: NURSE PRACTITIONER

## 2020-10-19 PROCEDURE — 3074F SYST BP LT 130 MM HG: CPT | Performed by: NURSE PRACTITIONER

## 2020-10-19 PROCEDURE — 3008F BODY MASS INDEX DOCD: CPT | Performed by: NURSE PRACTITIONER

## 2020-10-19 PROCEDURE — 87625 HPV TYPES 16 & 18 ONLY: CPT | Performed by: NURSE PRACTITIONER

## 2020-10-19 PROCEDURE — 87624 HPV HI-RISK TYP POOLED RSLT: CPT | Performed by: NURSE PRACTITIONER

## 2020-10-19 PROCEDURE — 99396 PREV VISIT EST AGE 40-64: CPT | Performed by: NURSE PRACTITIONER

## 2020-10-19 PROCEDURE — 3078F DIAST BP <80 MM HG: CPT | Performed by: NURSE PRACTITIONER

## 2020-10-19 PROCEDURE — 93000 ELECTROCARDIOGRAM COMPLETE: CPT | Performed by: NURSE PRACTITIONER

## 2020-10-19 NOTE — PROGRESS NOTES
HPI:   Fam Houston is a 48year old female who presents for an Annual Health Visit. Patient is present for annual exam. States that she has been doing well. States that she regularly takes trazodone to help her sleep. Otherwise feeling well. History      Hypertension,       Pneumonia 06/2018      Sleep disorder             PSH:      Cholecystectomy              FH:              SH:        , 2 grown children.               REVIEW OF SYSTEMS:     Review of Systems   Constitutional: Negativ tenderness. Genitourinary:    Vagina and uterus normal.     Musculoskeletal: Normal range of motion. Neurological: She is alert and oriented to person, place, and time. She has normal reflexes. Skin: Skin is warm and dry.    Psychiatric: She has a nor

## 2020-10-19 NOTE — ASSESSMENT & PLAN NOTE
Hypertension is unchanged. Plan: will continue present medications, check fasting lipids and CMP, reviewed diet, exercise and weight control, labs as ordered  Blood pressure status will be reassessed in 6 months .     HTN labs   Lab Results   Component

## 2020-10-19 NOTE — ASSESSMENT & PLAN NOTE
Hyperlipidemia is unchanged. Plan: will continue present medications, check fasting lipids and CMP, reviewed diet, exercise and weight control, labs as ordered  Cholesterol status will be reassessed in 6 months .     Lipids and AST/ALT  (most recent lab

## 2020-10-23 ENCOUNTER — TELEPHONE (OUTPATIENT)
Dept: FAMILY MEDICINE CLINIC | Facility: CLINIC | Age: 55
End: 2020-10-23

## 2020-10-23 NOTE — TELEPHONE ENCOUNTER
----- Message from Irasema Dill MD sent at 10/23/2020  2:14 PM CDT -----  Please clarify with lab   The pap appears normal and HPV 16, 18 and 45 are negative, but is there HPV there?    Result is confusing

## 2020-10-23 NOTE — TELEPHONE ENCOUNTER
Spoke with Tara Solano with [facility] lab. States the Value for HPV is Positive for High Risk.

## 2021-01-09 DIAGNOSIS — I10 ESSENTIAL HYPERTENSION: ICD-10-CM

## 2021-01-09 RX ORDER — TRAZODONE HYDROCHLORIDE 50 MG/1
TABLET ORAL
Qty: 90 TABLET | Refills: 0 | Status: SHIPPED | OUTPATIENT
Start: 2021-01-09 | End: 2021-04-13

## 2021-01-09 RX ORDER — HYDROCHLOROTHIAZIDE 25 MG/1
TABLET ORAL
Qty: 90 TABLET | Refills: 0 | Status: SHIPPED | OUTPATIENT
Start: 2021-01-09 | End: 2021-04-13

## 2021-01-09 RX ORDER — LISINOPRIL 10 MG/1
TABLET ORAL
Qty: 90 TABLET | Refills: 0 | Status: SHIPPED | OUTPATIENT
Start: 2021-01-09 | End: 2021-04-13

## 2021-01-09 NOTE — TELEPHONE ENCOUNTER
Please advise refills. Patient did not get bloodwork done as advised by Lion Smith. Will inform patient to get labs done asap as another refill will not be given until done.     Future appt:    Last Appointment with provider:   Visit date not found  Last a

## 2021-01-11 NOTE — TELEPHONE ENCOUNTER
Patient informed of refills given and will obtain labs at [facility] in the near future prior to needing another refill. Pt agreed.

## 2021-01-11 NOTE — TELEPHONE ENCOUNTER
----- Message from Davonna Sacks sent at 1/11/2021 11:06 AM CST -----  [facility]- wants detailed message left on VM

## 2021-04-04 DIAGNOSIS — I10 ESSENTIAL HYPERTENSION: ICD-10-CM

## 2021-04-05 NOTE — TELEPHONE ENCOUNTER
Future appt:    Last Appointment with provider:   10/19/2020(Boo)-F/U in 6 months  Last appointment at [facility]:  10/19/2020    DROCHLOROTHIAZIDE 25 MG Oral Tab    90tab  0refill        Filled:1/9/21 Summary: TAKE 1 TABLET BY MOUTH EVERY DAY        JOSE

## 2021-04-13 RX ORDER — TRAZODONE HYDROCHLORIDE 50 MG/1
TABLET ORAL
Qty: 90 TABLET | Refills: 0 | Status: SHIPPED | OUTPATIENT
Start: 2021-04-13 | End: 2021-07-08

## 2021-04-13 RX ORDER — HYDROCHLOROTHIAZIDE 25 MG/1
TABLET ORAL
Qty: 90 TABLET | Refills: 0 | Status: SHIPPED | OUTPATIENT
Start: 2021-04-13 | End: 2021-07-08

## 2021-04-13 RX ORDER — LISINOPRIL 10 MG/1
TABLET ORAL
Qty: 90 TABLET | Refills: 0 | Status: SHIPPED | OUTPATIENT
Start: 2021-04-13 | End: 2021-07-08

## 2021-04-15 ENCOUNTER — TELEPHONE (OUTPATIENT)
Dept: FAMILY MEDICINE CLINIC | Facility: CLINIC | Age: 56
End: 2021-04-15

## 2021-04-15 DIAGNOSIS — E78.00 PURE HYPERCHOLESTEROLEMIA: Primary | ICD-10-CM

## 2021-04-15 RX ORDER — FENOFIBRATE 54 MG/1
54 TABLET ORAL DAILY
Qty: 30 TABLET | Refills: 2 | Status: SHIPPED | OUTPATIENT
Start: 2021-04-15 | End: 2021-07-08

## 2021-04-15 NOTE — TELEPHONE ENCOUNTER
----- Message from MARIELY Brennan sent at 4/15/2021  9:10 AM CDT -----  Please let patient know that her labs show there is a slight elevation of blood sugar - indicating pre-diabetes.  Recommend to limit the \"white foods\" (flour, sugar, rice, herman

## 2021-07-03 DIAGNOSIS — I10 ESSENTIAL HYPERTENSION: ICD-10-CM

## 2021-07-03 DIAGNOSIS — E78.00 PURE HYPERCHOLESTEROLEMIA: ICD-10-CM

## 2021-07-06 NOTE — TELEPHONE ENCOUNTER
Future appt:    Last Appointment with provider:   10/19/20 physical  Last appointment at [facility]:  Visit date not found  CHOLESTEROL, TOTAL (mg/dL)   Date Value   04/14/2021 227 (H)     HDL CHOLESTEROL (mg/dL)   Date Value   04/14/2021 42 (L)     LDL-

## 2021-07-07 NOTE — TELEPHONE ENCOUNTER
Patient is over due for 6 month follow up.  Please have her schedule and then will give refill until appointment

## 2021-07-08 ENCOUNTER — TELEPHONE (OUTPATIENT)
Dept: FAMILY MEDICINE CLINIC | Facility: CLINIC | Age: 56
End: 2021-07-08

## 2021-07-08 RX ORDER — LISINOPRIL 10 MG/1
TABLET ORAL
Qty: 90 TABLET | Refills: 0 | Status: SHIPPED | OUTPATIENT
Start: 2021-07-08 | End: 2021-08-16

## 2021-07-08 RX ORDER — HYDROCHLOROTHIAZIDE 25 MG/1
TABLET ORAL
Qty: 90 TABLET | Refills: 0 | Status: SHIPPED | OUTPATIENT
Start: 2021-07-08 | End: 2021-08-16

## 2021-07-08 RX ORDER — FENOFIBRATE 54 MG/1
54 TABLET ORAL DAILY
Qty: 90 TABLET | Refills: 0 | Status: SHIPPED | OUTPATIENT
Start: 2021-07-08 | End: 2021-08-16

## 2021-07-08 RX ORDER — TRAZODONE HYDROCHLORIDE 50 MG/1
TABLET ORAL
Qty: 90 TABLET | Refills: 0 | Status: SHIPPED | OUTPATIENT
Start: 2021-07-08 | End: 2021-08-16

## 2021-07-08 NOTE — TELEPHONE ENCOUNTER
Future Appointments   Date Time Provider Ernesto Chan   8/16/2021  2:15 PM Ezra Velázquez MD [facility]

## 2021-07-08 NOTE — TELEPHONE ENCOUNTER
Pt called to check status on refill. Per notes tried to make pt f/u appt but pt states she does not understand why she needs an appt.

## 2021-07-08 NOTE — TELEPHONE ENCOUNTER
RF  1)  Lisinipil     2)  HCTZ   3) Trazadone  4)  Senofibrate 54mg      to     [facility]   For #90 days         appt 8/16 with Dr Fredy Barriga   Date Time Provider [facility]   8/16/2021  2:15 PM Kathy Kumar MD

## 2021-08-16 ENCOUNTER — OFFICE VISIT (OUTPATIENT)
Dept: FAMILY MEDICINE CLINIC | Facility: CLINIC | Age: 56
End: 2021-08-16
Payer: COMMERCIAL

## 2021-08-16 VITALS
SYSTOLIC BLOOD PRESSURE: 118 MMHG | TEMPERATURE: 98 F | WEIGHT: 112.81 LBS | RESPIRATION RATE: 16 BRPM | OXYGEN SATURATION: 97 % | HEART RATE: 86 BPM | DIASTOLIC BLOOD PRESSURE: 70 MMHG | BODY MASS INDEX: 20.76 KG/M2 | HEIGHT: 62 IN

## 2021-08-16 DIAGNOSIS — E78.00 PURE HYPERCHOLESTEROLEMIA: ICD-10-CM

## 2021-08-16 DIAGNOSIS — Z00.00 HEALTH CARE MAINTENANCE: Primary | ICD-10-CM

## 2021-08-16 DIAGNOSIS — I10 ESSENTIAL HYPERTENSION: ICD-10-CM

## 2021-08-16 PROCEDURE — 3078F DIAST BP <80 MM HG: CPT | Performed by: FAMILY MEDICINE

## 2021-08-16 PROCEDURE — 99396 PREV VISIT EST AGE 40-64: CPT | Performed by: FAMILY MEDICINE

## 2021-08-16 PROCEDURE — 3074F SYST BP LT 130 MM HG: CPT | Performed by: FAMILY MEDICINE

## 2021-08-16 PROCEDURE — 3008F BODY MASS INDEX DOCD: CPT | Performed by: FAMILY MEDICINE

## 2021-08-16 RX ORDER — TRAZODONE HYDROCHLORIDE 50 MG/1
50 TABLET ORAL NIGHTLY
Qty: 90 TABLET | Refills: 3 | Status: SHIPPED | OUTPATIENT
Start: 2021-08-16

## 2021-08-16 RX ORDER — BUDESONIDE 1 MG/2ML
INHALANT ORAL
COMMUNITY
Start: 2021-04-13 | End: 2021-08-16

## 2021-08-16 RX ORDER — DOXYCYCLINE HYCLATE 100 MG/1
100 CAPSULE ORAL 2 TIMES DAILY
COMMUNITY
Start: 2021-03-21 | End: 2021-08-16

## 2021-08-16 RX ORDER — HYDROCHLOROTHIAZIDE 25 MG/1
25 TABLET ORAL DAILY
Qty: 90 TABLET | Refills: 3 | Status: SHIPPED | OUTPATIENT
Start: 2021-08-16

## 2021-08-16 RX ORDER — LISINOPRIL 10 MG/1
10 TABLET ORAL DAILY
Qty: 90 TABLET | Refills: 3 | Status: SHIPPED | OUTPATIENT
Start: 2021-08-16

## 2021-08-16 RX ORDER — CEFUROXIME AXETIL 500 MG/1
500 TABLET ORAL 2 TIMES DAILY
COMMUNITY
Start: 2021-04-10 | End: 2021-08-16

## 2021-08-16 NOTE — PROGRESS NOTES
Chief Complaint:   Patient presents with:  Blood Pressure  Muscle Pain: Muscle pain and tightness in neck and shoulders  Joint Pain: Joint pain in elbows and wrists       HPI:   This is a 48year old female coming in for healthcare check.     Discussed hea 33.7 32.0 - 36.0 g/dL    RDW 12.3 11.0 - 15.0 %    PLATELET COUNT 491 665 - 400 Thousand/uL    MPV 10.0 7.5 - 12.5 fL    ABSOLUTE NEUTROPHILS 8,681 (H) 1,500 - 7,800 cells/uL    ABSOLUTE LYMPHOCYTES 2,200 850 - 3,900 cells/uL    ABSOLUTE MONOCYTES 573 200 Images      Procedure       Monolayers:  1, specimen collected in Thinprep vial, 20 ml Preservcyt      Clinical Information       Z01.419 Encounter For Well Woman Exam.        Reason for testing Screening     Gyn Additional Information       NOTE:  The Pap Spouse name: Not on file      Number of children: Not on file      Years of education: Not on file      Highest education level: Not on file    Tobacco Use      Smoking status: Current Every Day Smoker        Packs/day: 0.50        Years: 33.00        Pack discharge. MUSCULOSKELETAL:  Denies weakness, muscle aches,   NEUROLOGICAL:  Denies headache, dizziness,   HEMATOLOGIC:  Denies bleeding or bruising. PSYCHIATRIC:  Denies depression or anxiety.   ENDOCRINOLOGIC:  Denies cold or heat intolerance,   ALLERGI 10 MG Oral Tab 90 tablet 3     Sig: Take 1 tablet (10 mg total) by mouth daily. • hydrochlorothiazide 25 MG Oral Tab 90 tablet 3     Sig: Take 1 tablet (25 mg total) by mouth daily.        Patient Instructions   Good exam       Ok for refills as needed

## 2021-12-14 NOTE — PATIENT INSTRUCTIONS
Directed to take antibiotics until gone. Recommend to eat yogurt or take probiotic daily while on antibiotic. Treat symptoms as needed. Do not drive on the medication. Return to clinic if not better in 48-72 hours. Otherwise follow-up as needed. My signature below certifies that the above stated patient is homebound and upon completion of the Face-To-Face encounter, has the need for intermittent skilled nursing, physical therapy and/or speech or occupational therapy services in their home for their current diagnosis as outlined in their initial plan of care. These services will continue to be monitored by myself or another physician.

## 2022-07-26 ENCOUNTER — OFFICE VISIT (OUTPATIENT)
Dept: FAMILY MEDICINE CLINIC | Facility: CLINIC | Age: 57
End: 2022-07-26
Payer: COMMERCIAL

## 2022-07-26 VITALS
WEIGHT: 114 LBS | RESPIRATION RATE: 16 BRPM | BODY MASS INDEX: 20.98 KG/M2 | HEIGHT: 62 IN | TEMPERATURE: 98 F | DIASTOLIC BLOOD PRESSURE: 70 MMHG | HEART RATE: 86 BPM | OXYGEN SATURATION: 94 % | SYSTOLIC BLOOD PRESSURE: 110 MMHG

## 2022-07-26 DIAGNOSIS — K40.90 INGUINAL HERNIA, LEFT: Primary | ICD-10-CM

## 2022-07-26 DIAGNOSIS — B35.3 TINEA PEDIS OF BOTH FEET: ICD-10-CM

## 2022-07-26 PROCEDURE — 3008F BODY MASS INDEX DOCD: CPT | Performed by: FAMILY MEDICINE

## 2022-07-26 PROCEDURE — 99214 OFFICE O/P EST MOD 30 MIN: CPT | Performed by: FAMILY MEDICINE

## 2022-07-26 PROCEDURE — 3078F DIAST BP <80 MM HG: CPT | Performed by: FAMILY MEDICINE

## 2022-07-26 PROCEDURE — 3074F SYST BP LT 130 MM HG: CPT | Performed by: FAMILY MEDICINE

## 2022-07-26 RX ORDER — KETOCONAZOLE 20 MG/G
1 CREAM TOPICAL 2 TIMES DAILY
Qty: 60 G | Refills: 1 | Status: SHIPPED | OUTPATIENT
Start: 2022-07-26

## 2022-09-12 DIAGNOSIS — I10 ESSENTIAL HYPERTENSION: ICD-10-CM

## 2022-09-12 NOTE — TELEPHONE ENCOUNTER
Future appt:    Last Appointment with provider:   7/26/2022 for left inguinal hernia. Last appointment at [facility]:  7/26/2022  CHOLESTEROL, TOTAL (mg/dL)   Date Value   04/14/2021 227 (H)     HDL CHOLESTEROL (mg/dL)   Date Value   04/14/2021 42 (L)     LDL-CHOLESTEROL (mg/dL (calc))   Date Value   04/14/2021 153 (H)     TRIGLYCERIDES (mg/dL)   Date Value   04/14/2021 183 (H)     No results found for: EAG, A1C  Lab Results   Component Value Date    TSH 0.510 03/26/2015    TSHT4 1.12 04/14/2021       No follow-ups on file.

## 2022-09-14 RX ORDER — HYDROCHLOROTHIAZIDE 25 MG/1
TABLET ORAL
Qty: 30 TABLET | Refills: 0 | Status: SHIPPED | OUTPATIENT
Start: 2022-09-14

## 2022-09-14 RX ORDER — LISINOPRIL 10 MG/1
TABLET ORAL
Qty: 30 TABLET | Refills: 0 | Status: SHIPPED | OUTPATIENT
Start: 2022-09-14

## 2022-09-14 RX ORDER — TRAZODONE HYDROCHLORIDE 50 MG/1
TABLET ORAL
Qty: 30 TABLET | Refills: 0 | Status: SHIPPED | OUTPATIENT
Start: 2022-09-14

## 2022-10-10 DIAGNOSIS — I10 ESSENTIAL HYPERTENSION: ICD-10-CM

## 2022-10-10 NOTE — TELEPHONE ENCOUNTER
HYDROCHLOROTHIAZIDE 25 MG Oral Tab     #30  R-0       Summary: TAKE 1 TABLET BY MOUTH EVERY DAY        LISINOPRIL 10 MG Oral Tab     #30  R-0       Summary: TAKE 1 TABLET BY MOUTH EVERY DAY        TRAZODONE 50 MG Oral Tab     #30  R-0       Summary: TAKE 1 TABLET BY MOUTH EVERY DAY AT NIGHT            Future appt:    Last Appointment with provider:   7/26/2022  Last appointment at [facility]:  7/26/2022  CHOLESTEROL, TOTAL (mg/dL)   Date Value   04/14/2021 227 (H)     HDL CHOLESTEROL (mg/dL)   Date Value   04/14/2021 42 (L)     LDL-CHOLESTEROL (mg/dL (calc))   Date Value   04/14/2021 153 (H)     TRIGLYCERIDES (mg/dL)   Date Value   04/14/2021 183 (H)     No results found for: EAG, A1C  Lab Results   Component Value Date    TSH 0.510 03/26/2015    TSHT4 1.12 04/14/2021       No follow-ups on file.

## 2022-10-17 RX ORDER — TRAZODONE HYDROCHLORIDE 50 MG/1
50 TABLET ORAL NIGHTLY
Qty: 30 TABLET | Refills: 1 | Status: SHIPPED | OUTPATIENT
Start: 2022-10-17 | End: 2022-11-12

## 2022-10-17 RX ORDER — LISINOPRIL 10 MG/1
10 TABLET ORAL DAILY
Qty: 30 TABLET | Refills: 1 | Status: SHIPPED | OUTPATIENT
Start: 2022-10-17 | End: 2022-11-11

## 2022-10-17 RX ORDER — HYDROCHLOROTHIAZIDE 25 MG/1
25 TABLET ORAL DAILY
Qty: 30 TABLET | Refills: 1 | Status: SHIPPED | OUTPATIENT
Start: 2022-10-17 | End: 2022-11-12

## 2022-10-17 NOTE — TELEPHONE ENCOUNTER
appt made    Future Appointments   Date Time Provider Ernesto Rocio   12/20/2022  2:00 PM Isabella Benito MD [facility] [place]

## 2022-11-11 DIAGNOSIS — I10 ESSENTIAL HYPERTENSION: ICD-10-CM

## 2022-11-11 NOTE — TELEPHONE ENCOUNTER
Last refill - 10/17/22 # 30 w/1 refill    Future appt: Your appointments     Date & Time Appointment Department [facility])    Dec 20, 2022  2:00 PM CST Physical - Established with Sanjay Brothers MD [address] [facility], [place] ([facility])            [address] [facility]  [place] [id number]  [phone]          Last Appointment with provider: 7/26/2022 - gilda  Last appointment at [facility]:  7/26/2022      CHOLESTEROL, TOTAL (mg/dL)   Date Value   04/14/2021 227 (H)     HDL CHOLESTEROL (mg/dL)   Date Value   04/14/2021 42 (L)     LDL-CHOLESTEROL (mg/dL (calc))   Date Value   04/14/2021 153 (H)     TRIGLYCERIDES (mg/dL)   Date Value   04/14/2021 183 (H)     No results found for: EAG, A1C  Lab Results   Component Value Date    TSH 0.510 03/26/2015    TSHT4 1.12 04/14/2021       No follow-ups on file.

## 2022-11-11 NOTE — TELEPHONE ENCOUNTER
Last refill hydrochlorothiazide 10/17/22 #30 w/1 refill  Last refill trazodone 10/17/22 #30 w/1 refill    Last px 8/16/21    Future appt: Your appointments     Date & Time Appointment Department [facility])    Dec 20, 2022  2:00 PM CST Physical - Established with Piotr Lane MD [address] [facility] ([facility])            [address] [facility] [phone]  [phone]          Last Appointment with provider: 7/26/2022 - hernia  Last appointment at [facility]:  7/26/2022    CHOLESTEROL, TOTAL (mg/dL)   Date Value   04/14/2021 227 (H)     HDL CHOLESTEROL (mg/dL)   Date Value   04/14/2021 42 (L)     LDL-CHOLESTEROL (mg/dL (calc))   Date Value   04/14/2021 153 (H)     TRIGLYCERIDES (mg/dL)   Date Value   04/14/2021 183 (H)     No results found for: EAG, A1C  Lab Results   Component Value Date    TSH 0.510 03/26/2015    TSHT4 1.12 04/14/2021       No follow-ups on file.

## 2022-11-12 RX ORDER — HYDROCHLOROTHIAZIDE 25 MG/1
25 TABLET ORAL DAILY
Qty: 30 TABLET | Refills: 1 | Status: SHIPPED | OUTPATIENT
Start: 2022-11-12

## 2022-11-12 RX ORDER — TRAZODONE HYDROCHLORIDE 50 MG/1
TABLET ORAL
Qty: 30 TABLET | Refills: 1 | Status: SHIPPED | OUTPATIENT
Start: 2022-11-12

## 2022-11-12 RX ORDER — LISINOPRIL 10 MG/1
TABLET ORAL
Qty: 30 TABLET | Refills: 1 | Status: SHIPPED | OUTPATIENT
Start: 2022-11-12

## 2022-12-08 DIAGNOSIS — I10 ESSENTIAL HYPERTENSION: ICD-10-CM

## 2022-12-08 RX ORDER — TRAZODONE HYDROCHLORIDE 50 MG/1
TABLET ORAL
Qty: 30 TABLET | Refills: 1 | OUTPATIENT
Start: 2022-12-08

## 2022-12-08 RX ORDER — HYDROCHLOROTHIAZIDE 25 MG/1
25 TABLET ORAL DAILY
Qty: 30 TABLET | Refills: 1 | OUTPATIENT
Start: 2022-12-08

## 2022-12-08 NOTE — TELEPHONE ENCOUNTER
Last refill 11/2/22 #30 with 1 refill      Future appt: Your appointments     Date & Time Appointment Department [facility])    Dec 20, 2022  2:00 PM CST Physical - Established with Aamir Elias MD 25 [facility] ([facility])            25 [facility]  [facility] [id number]  [phone]        Last Appointment with provider:   7/26/2022  Last appointment at [facility]:  7/26/2022  CHOLESTEROL, TOTAL (mg/dL)   Date Value   04/14/2021 227 (H)     HDL CHOLESTEROL (mg/dL)   Date Value   04/14/2021 42 (L)     LDL-CHOLESTEROL (mg/dL (calc))   Date Value   04/14/2021 153 (H)     TRIGLYCERIDES (mg/dL)   Date Value   04/14/2021 183 (H)     No results found for: EAG, A1C  Lab Results   Component Value Date    TSH 0.510 03/26/2015    TSHT4 1.12 04/14/2021       No follow-ups on file.

## 2022-12-09 RX ORDER — LISINOPRIL 10 MG/1
TABLET ORAL
Qty: 30 TABLET | Refills: 1 | Status: SHIPPED | OUTPATIENT
Start: 2022-12-09

## 2022-12-13 DIAGNOSIS — I10 ESSENTIAL HYPERTENSION: ICD-10-CM

## 2022-12-13 RX ORDER — HYDROCHLOROTHIAZIDE 25 MG/1
25 TABLET ORAL DAILY
Qty: 90 TABLET | Refills: 0 | Status: SHIPPED | OUTPATIENT
Start: 2022-12-13

## 2022-12-13 RX ORDER — LISINOPRIL 10 MG/1
10 TABLET ORAL DAILY
Qty: 90 TABLET | Refills: 0 | Status: SHIPPED | OUTPATIENT
Start: 2022-12-13

## 2022-12-13 RX ORDER — TRAZODONE HYDROCHLORIDE 50 MG/1
50 TABLET ORAL NIGHTLY
Qty: 90 TABLET | Refills: 0 | Status: SHIPPED | OUTPATIENT
Start: 2022-12-13

## 2022-12-13 NOTE — TELEPHONE ENCOUNTER
Refill hydrochlorothiazide - 11/12/22 #30 w/1 refill  Refill lisinopril - 12/9/22  #30 w/1 refill  Refill trazodone - 11/12/22  #30 w/1 refill    Future appt: Your appointments     Date & Time Appointment Department [facility])    Dec 20, 2022  2:00 PM CST Physical - [facility]            25 [facility]  [address] [phone]  [phone]           Last Appointment with provider: 7/26/2022 - hernia  Last appointment at [facility]:  7/26/2022  CHOLESTEROL, TOTAL (mg/dL)   Date Value   04/14/2021 227 (H)     HDL CHOLESTEROL (mg/dL)   Date Value   04/14/2021 42 (L)     LDL-CHOLESTEROL (mg/dL (calc))   Date Value   04/14/2021 153 (H)     TRIGLYCERIDES (mg/dL)   Date Value   04/14/2021 183 (H)     No results found for: EAG, A1C  Lab Results   Component Value Date    TSH 0.510 03/26/2015    TSHT4 1.12 04/14/2021       No follow-ups on file.

## 2023-01-25 ENCOUNTER — OFFICE VISIT (OUTPATIENT)
Dept: FAMILY MEDICINE CLINIC | Facility: CLINIC | Age: 58
End: 2023-01-25
Payer: COMMERCIAL

## 2023-01-25 VITALS
WEIGHT: 121.63 LBS | HEART RATE: 80 BPM | RESPIRATION RATE: 16 BRPM | OXYGEN SATURATION: 98 % | SYSTOLIC BLOOD PRESSURE: 116 MMHG | BODY MASS INDEX: 22.38 KG/M2 | HEIGHT: 62 IN | TEMPERATURE: 99 F | DIASTOLIC BLOOD PRESSURE: 76 MMHG

## 2023-01-25 DIAGNOSIS — Z00.00 HEALTH CARE MAINTENANCE: Primary | ICD-10-CM

## 2023-01-25 DIAGNOSIS — I10 ESSENTIAL HYPERTENSION: ICD-10-CM

## 2023-01-25 PROCEDURE — 3078F DIAST BP <80 MM HG: CPT | Performed by: FAMILY MEDICINE

## 2023-01-25 PROCEDURE — 99396 PREV VISIT EST AGE 40-64: CPT | Performed by: FAMILY MEDICINE

## 2023-01-25 PROCEDURE — 3074F SYST BP LT 130 MM HG: CPT | Performed by: FAMILY MEDICINE

## 2023-01-25 PROCEDURE — 3008F BODY MASS INDEX DOCD: CPT | Performed by: FAMILY MEDICINE

## 2023-01-25 RX ORDER — LISINOPRIL 10 MG/1
10 TABLET ORAL DAILY
Qty: 90 TABLET | Refills: 3 | Status: SHIPPED | OUTPATIENT
Start: 2023-01-25

## 2023-01-25 RX ORDER — TRAZODONE HYDROCHLORIDE 50 MG/1
50 TABLET ORAL NIGHTLY
Qty: 90 TABLET | Refills: 3 | Status: SHIPPED | OUTPATIENT
Start: 2023-01-25

## 2023-01-25 RX ORDER — HYDROCHLOROTHIAZIDE 25 MG/1
25 TABLET ORAL DAILY
Qty: 90 TABLET | Refills: 3 | Status: SHIPPED | OUTPATIENT
Start: 2023-01-25

## 2023-01-25 RX ORDER — CHOLECALCIFEROL (VITAMIN D3) 125 MCG
500 CAPSULE ORAL DAILY
COMMUNITY

## 2023-01-25 RX ORDER — KETOCONAZOLE 20 MG/G
1 CREAM TOPICAL 2 TIMES DAILY
Qty: 60 G | Refills: 1 | Status: SHIPPED | OUTPATIENT
Start: 2023-01-25

## 2023-01-25 RX ORDER — MELATONIN
50 DAILY
COMMUNITY

## (undated) NOTE — LETTER
[address] Anaid Walsh  [facility] [address]  [phone]                  1/26/2018        Mildred Dust [address] [facility] [address] [place] [address]        Dear Patient,     According to o

## (undated) NOTE — LETTER
09/20/19        Lina Henson 70      Dear Carmelina Shah,    [address] [facility] records indicate that you have outstanding lab work and or testing that was ordered for you and has not yet been completed:  Orders Placed This Encounter

## (undated) NOTE — LETTER
[address] [place] [address]  [phone]        08/31/17      S[place] Hi [address] [address] [address] [address]        Dear Noah Yañez      To help us provid

## (undated) NOTE — LETTER
Date: 6/27/2018    Patient Name: Elizabeth Wells          To Whom it may concern: This letter has been written at the patient's request. Patient called and is not feeling better. Please extend her work excuse through 6/28/18.        Please call if you h

## (undated) NOTE — MR AVS SNAPSHOT
After Visit Summary   10/19/2020    Justice Gotti    MRN: [id number]           Visit Information     Date & Time  10/19/2020  3:30 PM Provider  MARIELY Desouza Department  25 [facility], [facility] Dept.  Phone  [phone] THINPREP PAP WITH HPV REFLEX REQUEST [[id number] CUSTOM]     TSH W REFLEX TO FREE T4 [[id number] CUSTOM]     Future Labs/Procedures Expected by Expires    HPV HIGH RISK , THIN PREP COLLECTION [[id number] CUSTOM]  10/19/2020 10/19/2021    THINPREP PAP WITH HPV REFL 8. Enter your e-mail address. You will receive e-mail notification when new information is available in [address]. Click Sign In. You can now view your medical record.     Additional Information  If you have questions, you can call [phone] to talk Monday – Friday  8:00 am – 8:00 pm   Saturday – Sunday  8:00 am – 4:00 pm    WALK-IN CARE  Primary Care Providers  Treatment for acute illness   or injury that are   non-life-threatening.   Also available by appointment     Average cost  $70*       [facility]

## (undated) NOTE — LETTER
Date: 6/29/2018    Patient Name: Trent Austin          To Whom it may concern: This letter has been written at the patient's request. The above patient was seen at the [facility] for treatment of a medical condition.     This patient sh

## (undated) NOTE — LETTER
Date: 6/26/2018    Patient Name: Kate Person          To Whom it may concern: This letter has been written at the patient's request. The above patient was seen at the [facility] for treatment of a medical condition.     This patient sh

## (undated) NOTE — LETTER
Date: 4/4/2018    Patient Name: Siria Shah          To Whom it may concern: This letter has been written at the patient's request. The above patient was seen at the [facility] for treatment of a medical condition.     This patient was

## (undated) NOTE — LETTER
01/26/21        Ibrahima Amanda  [address] [place] [address]      Dear Cheral Claude,    [address] records indicate that you have outstanding lab work and or testing that was ordered for you and has not yet been completed:  Orders Placed This Encounter